# Patient Record
Sex: FEMALE | URBAN - METROPOLITAN AREA
[De-identification: names, ages, dates, MRNs, and addresses within clinical notes are randomized per-mention and may not be internally consistent; named-entity substitution may affect disease eponyms.]

---

## 2022-09-26 ENCOUNTER — HOME CARE VISIT (OUTPATIENT)
Dept: HOME HEALTH SERVICES | Facility: HOME HEALTH | Age: 87
End: 2022-09-26

## 2022-09-26 ENCOUNTER — HOME HEALTH ADMISSION (OUTPATIENT)
Dept: HOME HEALTH SERVICES | Facility: HOME HEALTH | Age: 87
End: 2022-09-26

## 2022-09-26 ENCOUNTER — HOME CARE VISIT (OUTPATIENT)
Dept: SCHEDULING | Facility: HOME HEALTH | Age: 87
End: 2022-09-26

## 2022-09-26 PROCEDURE — 0221000100 HH NO PAY CLAIM PROCEDURE

## 2022-09-26 PROCEDURE — G0299 HHS/HOSPICE OF RN EA 15 MIN: HCPCS

## 2022-09-27 ENCOUNTER — HOME CARE VISIT (OUTPATIENT)
Dept: HOME HEALTH SERVICES | Facility: HOME HEALTH | Age: 87
End: 2022-09-27

## 2022-09-28 VITALS
HEART RATE: 82 BPM | SYSTOLIC BLOOD PRESSURE: 136 MMHG | OXYGEN SATURATION: 93 % | DIASTOLIC BLOOD PRESSURE: 80 MMHG | RESPIRATION RATE: 18 BRPM | TEMPERATURE: 97.8 F

## 2022-09-28 ASSESSMENT — ENCOUNTER SYMPTOMS
PAIN LOCATION - PAIN QUALITY: ACHES
DYSPNEA ACTIVITY LEVEL: AFTER AMBULATING MORE THAN 20 FT

## 2022-09-28 NOTE — HOME HEALTH
Problem: RLE open wound to inner ankle, pink, moderate SS drainage 3x2x0.1. PMH: COPD, CHF, HTN, HLD, DM, Parkinsons. Intervention: [facility] SN SOC: Patient resides in [facility], alert and oriented x3, follows commands, mild forgetfulness, limited mobility uses walker for very short distance, wheelchair. Admitted to [facility] for SN wound care, as noted above, wound care per POC, tolerated well. CHF/COPD managed with diet and medications, VS/SATS WNL, lungs clear, edema absent, has Oxygen does not use regularly 3LNC PRN, SOB with exertion, patient at baseline. DM managed with diet/medications, -197 checking twice weekly by [facility]. Medications reconciled/managed by [facility]. Instructed on [facility] SOC when to call/911, High risk medications Eliquis/risk for bleeding/Insulin/hypoglycemia, DM/COPD/CHF, wound care/diet to promote healing, safety/fall/pressure injury/bleeding precautions, medications/disease management, and plan of care, taught back understanding, needs reinforcement. Goal: Patient will be safe at home free from falls/injury/infectio, wound healed, free from complications of CHF/COPD/DM/bleeding.

## 2022-09-30 ENCOUNTER — HOME CARE VISIT (OUTPATIENT)
Dept: HOME HEALTH SERVICES | Facility: HOME HEALTH | Age: 87
End: 2022-09-30
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
## 2022-10-03 ENCOUNTER — HOME CARE VISIT (OUTPATIENT)
Dept: HOME HEALTH SERVICES | Facility: HOME HEALTH | Age: 87
End: 2022-10-03

## 2022-11-14 ENCOUNTER — HOME HEALTH ADMISSION (OUTPATIENT)
Dept: HOME HEALTH SERVICES | Facility: HOME HEALTH | Age: 87
End: 2022-11-14
Payer: MEDICARE

## 2022-11-16 ENCOUNTER — HOME CARE VISIT (OUTPATIENT)
Dept: HOME HEALTH SERVICES | Facility: HOME HEALTH | Age: 87
End: 2022-11-16
Payer: MEDICARE

## 2022-11-16 ENCOUNTER — HOME CARE VISIT (OUTPATIENT)
Dept: SCHEDULING | Facility: HOME HEALTH | Age: 87
End: 2022-11-16
Payer: MEDICARE

## 2022-11-16 VITALS
SYSTOLIC BLOOD PRESSURE: 112 MMHG | RESPIRATION RATE: 18 BRPM | HEART RATE: 97 BPM | OXYGEN SATURATION: 96 % | TEMPERATURE: 98.2 F | DIASTOLIC BLOOD PRESSURE: 60 MMHG

## 2022-11-16 PROCEDURE — 1090000001 HH PPS REVENUE CREDIT

## 2022-11-16 PROCEDURE — 1090000002 HH PPS REVENUE DEBIT

## 2022-11-16 PROCEDURE — G0299 HHS/HOSPICE OF RN EA 15 MIN: HCPCS

## 2022-11-16 PROCEDURE — 0221000100 HH NO PAY CLAIM PROCEDURE

## 2022-11-16 PROCEDURE — G0152 HHCP-SERV OF OT,EA 15 MIN: HCPCS

## 2022-11-16 PROCEDURE — 400013 HH SOC

## 2022-11-16 PROCEDURE — G0151 HHCP-SERV OF PT,EA 15 MIN: HCPCS

## 2022-11-16 ASSESSMENT — ENCOUNTER SYMPTOMS: DYSPNEA ACTIVITY LEVEL: AFTER AMBULATING 10 - 20 FT

## 2022-11-16 NOTE — HOME HEALTH
Problem: GLF/syncope with LLE femur FX with routine healing. Left knee skin trauma 3x3x0.1, pink, moderate SS drainage, Left shin 0.5x0.5x0.1, pink, moderate SS drainage. PMH: COPD, Pulmonary Fibrosis, Dementia, DM, HTN, Parkinsons, Gerd, Neuropathy, OA, HLD, Hypothyroid, Afib. Intervention: [facility] SN SOC: Patient returned to [facility] yesterday from above, admitted to [place] [address] for SN/PT/OT. Alert and oriented x3, mildly forgetful, follows commands, requires moderate to max assistance with ADLS/transfers, ambulatory short distance with walker, uses wheelchair, weakness, unsteady gait, fall risk, deconditioning, will have therapy evaluation and treatment. Wounds as noted above, wound care per POC, instructed on wound care/diet/DM to promote healing, infection prevention, continue to reinforce. COPD/Pulmonary Fibrosis, lungs/chronic crackles to B/L bases at baseline, SOB with exertion, denies cough, VS/SATS WN on RA, no longer uses oxygen, instructed on cough/deep breathing/energy conservation, taught back understanding. DM managed by diet/medications, managed by [facility], checking BS 4x daily  today, feet clean dry and intact, instructed on DM/hypo/hyperglycemia, sick day, infection, foot care, needs reinforcement. Chronic pain R/T neuropathy/OA, aches/sharp with activity, treated with medications/rest 0-4/10. Medications reconciled, managed by [facility], reviewed with patient, instructed on high risk medications Insulin/hypoglycemia, Eliquis/risk for bleeding, continue to reinforce. Instructed on [place] [address] SOC when to call/911, safety/fall/infection/pressure injury/bleeding/COPD/DM precautions, medication/disease management, wound care, diet, DM, COPD, pain, and plan of care, taught back understanding, continue to reinforce. Goal: Patient will be safe at home free from falls/injury/infection, wounds healed, free from complications of DM/bleeding/COPD, rehabilitate to optimal level of function.

## 2022-11-17 PROCEDURE — 1090000002 HH PPS REVENUE DEBIT

## 2022-11-17 PROCEDURE — 1090000001 HH PPS REVENUE CREDIT

## 2022-11-18 PROCEDURE — 1090000001 HH PPS REVENUE CREDIT

## 2022-11-18 PROCEDURE — 1090000002 HH PPS REVENUE DEBIT

## 2022-11-19 ENCOUNTER — HOME CARE VISIT (OUTPATIENT)
Dept: HOME HEALTH SERVICES | Facility: HOME HEALTH | Age: 87
End: 2022-11-19
Payer: MEDICARE

## 2022-11-19 PROCEDURE — G0157 HHC PT ASSISTANT EA 15: HCPCS

## 2022-11-19 PROCEDURE — 1090000002 HH PPS REVENUE DEBIT

## 2022-11-19 PROCEDURE — 1090000001 HH PPS REVENUE CREDIT

## 2022-11-20 PROCEDURE — 1090000001 HH PPS REVENUE CREDIT

## 2022-11-20 PROCEDURE — 1090000002 HH PPS REVENUE DEBIT

## 2022-11-21 ENCOUNTER — HOME CARE VISIT (OUTPATIENT)
Dept: SCHEDULING | Facility: HOME HEALTH | Age: 87
End: 2022-11-21
Payer: MEDICARE

## 2022-11-21 ENCOUNTER — HOME CARE VISIT (OUTPATIENT)
Dept: HOME HEALTH SERVICES | Facility: HOME HEALTH | Age: 87
End: 2022-11-21
Payer: MEDICARE

## 2022-11-21 VITALS
SYSTOLIC BLOOD PRESSURE: 146 MMHG | TEMPERATURE: 97.8 F | HEART RATE: 80 BPM | RESPIRATION RATE: 18 BRPM | OXYGEN SATURATION: 93 % | DIASTOLIC BLOOD PRESSURE: 76 MMHG

## 2022-11-21 PROCEDURE — 1090000002 HH PPS REVENUE DEBIT

## 2022-11-21 PROCEDURE — G0152 HHCP-SERV OF OT,EA 15 MIN: HCPCS

## 2022-11-21 PROCEDURE — G0299 HHS/HOSPICE OF RN EA 15 MIN: HCPCS

## 2022-11-21 PROCEDURE — 1090000001 HH PPS REVENUE CREDIT

## 2022-11-21 ASSESSMENT — ENCOUNTER SYMPTOMS
BOWEL INCONTINENCE: 1
DYSPNEA ACTIVITY LEVEL: AFTER AMBULATING MORE THAN 20 FT

## 2022-11-21 NOTE — HOME HEALTH
Problem: LLE wounds proximal healing pink, small SS drainage, 2x1x0.1, distal 0.2x0.2x0.1, pink, scant SS drianage. Intervention: [facility] SN wound care per POC, tolerated well, instructd, on wound care/diet to promote healing, taught back understanding, DM managed with diet and medications . Instructed on wound care, DM, infection/safety/fall/bleeding/pressure injury precautions, medications/disease management, and plan of care, taught back understanding. Goal: Patient will be safe at home free from falls/injury/infection, wounds healed, free from complications of DM/bleeding.

## 2022-11-22 ENCOUNTER — HOME CARE VISIT (OUTPATIENT)
Dept: HOME HEALTH SERVICES | Facility: HOME HEALTH | Age: 87
End: 2022-11-22
Payer: MEDICARE

## 2022-11-22 PROCEDURE — 1090000002 HH PPS REVENUE DEBIT

## 2022-11-22 PROCEDURE — G0157 HHC PT ASSISTANT EA 15: HCPCS

## 2022-11-22 PROCEDURE — 1090000001 HH PPS REVENUE CREDIT

## 2022-11-23 ENCOUNTER — HOME CARE VISIT (OUTPATIENT)
Dept: SCHEDULING | Facility: HOME HEALTH | Age: 87
End: 2022-11-23
Payer: MEDICARE

## 2022-11-23 VITALS
HEART RATE: 90 BPM | TEMPERATURE: 97.8 F | RESPIRATION RATE: 18 BRPM | SYSTOLIC BLOOD PRESSURE: 128 MMHG | OXYGEN SATURATION: 98 % | DIASTOLIC BLOOD PRESSURE: 80 MMHG

## 2022-11-23 PROCEDURE — 1090000002 HH PPS REVENUE DEBIT

## 2022-11-23 PROCEDURE — 1090000001 HH PPS REVENUE CREDIT

## 2022-11-23 PROCEDURE — G0299 HHS/HOSPICE OF RN EA 15 MIN: HCPCS

## 2022-11-23 ASSESSMENT — ENCOUNTER SYMPTOMS: DYSPNEA ACTIVITY LEVEL: AFTER AMBULATING MORE THAN 20 FT

## 2022-11-24 PROCEDURE — 1090000001 HH PPS REVENUE CREDIT

## 2022-11-24 PROCEDURE — 1090000002 HH PPS REVENUE DEBIT

## 2022-11-25 PROCEDURE — 1090000001 HH PPS REVENUE CREDIT

## 2022-11-25 PROCEDURE — 1090000002 HH PPS REVENUE DEBIT

## 2022-11-26 ENCOUNTER — HOME CARE VISIT (OUTPATIENT)
Dept: HOME HEALTH SERVICES | Facility: HOME HEALTH | Age: 87
End: 2022-11-26
Payer: MEDICARE

## 2022-11-26 PROCEDURE — 1090000001 HH PPS REVENUE CREDIT

## 2022-11-26 PROCEDURE — 1090000002 HH PPS REVENUE DEBIT

## 2022-11-27 PROCEDURE — 1090000002 HH PPS REVENUE DEBIT

## 2022-11-27 PROCEDURE — 1090000001 HH PPS REVENUE CREDIT

## 2022-11-28 ENCOUNTER — HOME CARE VISIT (OUTPATIENT)
Dept: HOME HEALTH SERVICES | Facility: HOME HEALTH | Age: 87
End: 2022-11-28
Payer: MEDICARE

## 2022-11-28 ENCOUNTER — HOME CARE VISIT (OUTPATIENT)
Dept: SCHEDULING | Facility: HOME HEALTH | Age: 87
End: 2022-11-28
Payer: MEDICARE

## 2022-11-28 VITALS
OXYGEN SATURATION: 97 % | SYSTOLIC BLOOD PRESSURE: 128 MMHG | DIASTOLIC BLOOD PRESSURE: 62 MMHG | RESPIRATION RATE: 18 BRPM | HEART RATE: 97 BPM | TEMPERATURE: 98.1 F

## 2022-11-28 PROCEDURE — 1090000002 HH PPS REVENUE DEBIT

## 2022-11-28 PROCEDURE — 1090000001 HH PPS REVENUE CREDIT

## 2022-11-28 PROCEDURE — G0299 HHS/HOSPICE OF RN EA 15 MIN: HCPCS

## 2022-11-28 PROCEDURE — G0157 HHC PT ASSISTANT EA 15: HCPCS

## 2022-11-28 ASSESSMENT — ENCOUNTER SYMPTOMS: DYSPNEA ACTIVITY LEVEL: AFTER AMBULATING MORE THAN 20 FT

## 2022-11-28 NOTE — HOME HEALTH
Problem: LLE wounds dressing removed, CDI, pink,closed, may leave SHAE. Intervention: [facility] SN wound care per POC, tolerated well, instructed on wound care/diet to promote healing, infection/pressure injury prevention, needs reinforcement. DM managed with diet and medications,  today, with range 116-232, continue to monitor. Working with therapy to meet goals. Instructed on medications/disease management, DM, safety/fall/pressure injury/infection/bleeding precautions, needs reinforcement. Goal: Patient will be safe at home free from falls/injury/infection, free from complications of bleeding/DM, wound healed, rehabilitate to optimal level of function. yes

## 2022-11-29 ENCOUNTER — HOME CARE VISIT (OUTPATIENT)
Dept: HOME HEALTH SERVICES | Facility: HOME HEALTH | Age: 87
End: 2022-11-29
Payer: MEDICARE

## 2022-11-29 PROCEDURE — G0158 HHC OT ASSISTANT EA 15: HCPCS

## 2022-11-29 PROCEDURE — 1090000001 HH PPS REVENUE CREDIT

## 2022-11-29 PROCEDURE — 1090000002 HH PPS REVENUE DEBIT

## 2022-11-30 PROCEDURE — 1090000001 HH PPS REVENUE CREDIT

## 2022-11-30 PROCEDURE — 1090000002 HH PPS REVENUE DEBIT

## 2022-12-01 ENCOUNTER — HOME CARE VISIT (OUTPATIENT)
Dept: SCHEDULING | Facility: HOME HEALTH | Age: 87
End: 2022-12-01
Payer: MEDICARE

## 2022-12-01 VITALS
HEART RATE: 82 BPM | RESPIRATION RATE: 18 BRPM | SYSTOLIC BLOOD PRESSURE: 128 MMHG | OXYGEN SATURATION: 98 % | TEMPERATURE: 97.7 F | DIASTOLIC BLOOD PRESSURE: 70 MMHG

## 2022-12-01 PROCEDURE — 1090000001 HH PPS REVENUE CREDIT

## 2022-12-01 PROCEDURE — G0299 HHS/HOSPICE OF RN EA 15 MIN: HCPCS

## 2022-12-01 PROCEDURE — 1090000002 HH PPS REVENUE DEBIT

## 2022-12-01 ASSESSMENT — ENCOUNTER SYMPTOMS
DYSPNEA ACTIVITY LEVEL: AFTER AMBULATING MORE THAN 20 FT
BOWEL INCONTINENCE: 1

## 2022-12-01 NOTE — HOME HEALTH
Problem: LLE wounds dressing removed, CDI, pink, remains closed, may leave SHAE, monitor skin as she frequently opens up new wounds, slow healing R/T DM and circulation. Intervention: [facility] SN wound care per POC, tolerated well, instructed on wound care/diet to promote healing, infection/pressure injury prevention, needs reinforcement. DM managed with diet and medications,  today, with range 116-232, continue to monitor. Working with therapy to meet goals. Instructed on medications/disease management, DM, safety/fall/pressure injury/infection/bleeding precautions, needs reinforcement. Goal: Patient will be safe at home free from falls/injury/infection, free from complications of bleeding/DM, wound healed, rehabilitate to optimal level of function.

## 2022-12-02 ENCOUNTER — HOME CARE VISIT (OUTPATIENT)
Dept: HOME HEALTH SERVICES | Facility: HOME HEALTH | Age: 87
End: 2022-12-02
Payer: MEDICARE

## 2022-12-02 ENCOUNTER — HOME CARE VISIT (OUTPATIENT)
Dept: SCHEDULING | Facility: HOME HEALTH | Age: 87
End: 2022-12-02
Payer: MEDICARE

## 2022-12-02 VITALS
TEMPERATURE: 97.7 F | OXYGEN SATURATION: 96 % | SYSTOLIC BLOOD PRESSURE: 142 MMHG | DIASTOLIC BLOOD PRESSURE: 61 MMHG | HEART RATE: 92 BPM | RESPIRATION RATE: 18 BRPM

## 2022-12-02 PROCEDURE — G0299 HHS/HOSPICE OF RN EA 15 MIN: HCPCS

## 2022-12-02 PROCEDURE — G0157 HHC PT ASSISTANT EA 15: HCPCS

## 2022-12-02 ASSESSMENT — ENCOUNTER SYMPTOMS: DYSPNEA ACTIVITY LEVEL: AFTER AMBULATING MORE THAN 20 FT

## 2022-12-02 NOTE — HOME HEALTH
Problem: LLE wounds dressing removed, CDI, pink, remains closed, may leave SHAE, monitor skin as she frequently opens up new wounds, slow healing R/T DM and circulation. PRN visit for new Coccyx Stage II pressure injury 4x2x0.1, pink, scant SS drainage. Intervention: [facility] SN wound care per POC, tolerated well, instructed on wound care/diet to promote healing, infection/pressure injury prevention, needs reinforcement. DM managed with diet and medications,  today, with range 102-206, continue to monitor. Working with therapy to meet goals. Instructed on medications/disease management, DM, safety/fall/pressure injury/infection/bleeding precautions, needs reinforcement. Goal: Patient will be safe at home free from falls/injury/infection, free from complications of bleeding/DM, wound healed, rehabilitate to optimal level of function.

## 2022-12-06 ENCOUNTER — HOME CARE VISIT (OUTPATIENT)
Dept: HOME HEALTH SERVICES | Facility: HOME HEALTH | Age: 87
End: 2022-12-06
Payer: MEDICARE

## 2022-12-06 ENCOUNTER — HOME CARE VISIT (OUTPATIENT)
Dept: SCHEDULING | Facility: HOME HEALTH | Age: 87
End: 2022-12-06
Payer: MEDICARE

## 2022-12-06 VITALS
OXYGEN SATURATION: 98 % | TEMPERATURE: 97.8 F | HEART RATE: 96 BPM | SYSTOLIC BLOOD PRESSURE: 128 MMHG | RESPIRATION RATE: 18 BRPM | DIASTOLIC BLOOD PRESSURE: 60 MMHG

## 2022-12-06 PROCEDURE — G0299 HHS/HOSPICE OF RN EA 15 MIN: HCPCS

## 2022-12-06 PROCEDURE — G0158 HHC OT ASSISTANT EA 15: HCPCS

## 2022-12-06 ASSESSMENT — ENCOUNTER SYMPTOMS: DYSPNEA ACTIVITY LEVEL: AFTER AMBULATING MORE THAN 20 FT

## 2022-12-06 NOTE — HOME HEALTH
Problem: LLE wounds, CDI, pink, remains closed, may leave SHAE, monitor skin as she frequently opens up new wounds, slow healing R/T DM and circulation. Coccyx Stage II pressure injury 0.5x0.2x0.1, pink, scant SS drainage. Intervention: [facility] SN wound care per POC, tolerated well, instructed on wound care/diet to promote healing, infection/pressure injury prevention, needs reinforcement. DM managed with diet and medications,  today, with range 101-282continue to monitor. Working with therapy to meet goals. Instructed on medications/disease management, DM, safety/fall/pressure injury/infection/bleeding precautions, needs reinforcement. Goal: Patient will be safe at home free from falls/injury/infection, free from complications of bleeding/DM, wound healed, rehabilitate to optimal level of function. 22-Jun-2020 10:59

## 2022-12-07 ENCOUNTER — HOME CARE VISIT (OUTPATIENT)
Dept: HOME HEALTH SERVICES | Facility: HOME HEALTH | Age: 87
End: 2022-12-07
Payer: MEDICARE

## 2022-12-07 PROCEDURE — G0151 HHCP-SERV OF PT,EA 15 MIN: HCPCS

## 2022-12-08 ENCOUNTER — HOME CARE VISIT (OUTPATIENT)
Dept: SCHEDULING | Facility: HOME HEALTH | Age: 87
End: 2022-12-08
Payer: MEDICARE

## 2022-12-08 VITALS
OXYGEN SATURATION: 96 % | TEMPERATURE: 97.6 F | RESPIRATION RATE: 18 BRPM | HEART RATE: 100 BPM | DIASTOLIC BLOOD PRESSURE: 84 MMHG | SYSTOLIC BLOOD PRESSURE: 142 MMHG

## 2022-12-08 PROCEDURE — G0299 HHS/HOSPICE OF RN EA 15 MIN: HCPCS

## 2022-12-08 ASSESSMENT — ENCOUNTER SYMPTOMS: DYSPNEA ACTIVITY LEVEL: AFTER AMBULATING MORE THAN 20 FT

## 2022-12-08 NOTE — HOME HEALTH
Problem: LLE wounds, CDI, pink, remains closed, may leave SHAE, monitor skin as she frequently opens up new wounds, slow healing R/T DM and circulation. Coccyx Stage II pressure injury 0.2x0.2x0.1, pink, scant SS drainage. Intervention: [facility] SN wound care per POC, tolerated well, instructed on wound care/diet to promote healing, infection/pressure injury prevention, needs reinforcement. DM managed with diet and medications,  today, with range 101-282 continue to monitor. Working with therapy to meet goals. Instructed on medications/disease management, DM, safety/fall/pressure injury/infection/bleeding precautions, needs reinforcement. Goal: Patient will be safe at home free from falls/injury/infection, free from complications of bleeding/DM, wound healed, rehabilitate to optimal level of function.
13.5

## 2022-12-12 ENCOUNTER — HOME CARE VISIT (OUTPATIENT)
Dept: SCHEDULING | Facility: HOME HEALTH | Age: 87
End: 2022-12-12
Payer: MEDICARE

## 2022-12-12 VITALS
TEMPERATURE: 97.6 F | RESPIRATION RATE: 18 BRPM | SYSTOLIC BLOOD PRESSURE: 148 MMHG | DIASTOLIC BLOOD PRESSURE: 60 MMHG | OXYGEN SATURATION: 98 % | HEART RATE: 88 BPM

## 2022-12-12 PROCEDURE — G0299 HHS/HOSPICE OF RN EA 15 MIN: HCPCS

## 2022-12-12 ASSESSMENT — ENCOUNTER SYMPTOMS: DYSPNEA ACTIVITY LEVEL: AFTER AMBULATING MORE THAN 20 FT

## 2022-12-12 NOTE — HOME HEALTH
Problem: Coccyx Stage II pressure injury,, with routine healing, red/pink, Excoriated/Fragile, wound closed, high risk for reopening R/T moisture/immobility/pressure/friction, continue wound care/pressure injury prevention. Intervention: [facility] SN wound care per POC, tolerated well, instructed on wound care/diet to promote healing, infection/pressure injury prevention, needs reinforcement. DM managed with diet and medications,  today, with range 101-282 continue to monitor. Has completed therapy. Instructed on medications/disease management, DM, safety/fall/pressure injury/infection/bleeding precautions, needs reinforcement. Goal: Patient will be safe at home free from falls/injury/infection, free from complications of bleeding/DM, wound healed, rehabilitate to optimal level of function.

## 2022-12-15 ENCOUNTER — HOME CARE VISIT (OUTPATIENT)
Dept: SCHEDULING | Facility: HOME HEALTH | Age: 87
End: 2022-12-15
Payer: MEDICARE

## 2022-12-15 VITALS
OXYGEN SATURATION: 99 % | RESPIRATION RATE: 18 BRPM | HEART RATE: 88 BPM | TEMPERATURE: 97.4 F | DIASTOLIC BLOOD PRESSURE: 60 MMHG | SYSTOLIC BLOOD PRESSURE: 112 MMHG

## 2022-12-15 PROCEDURE — G0299 HHS/HOSPICE OF RN EA 15 MIN: HCPCS

## 2022-12-15 ASSESSMENT — ENCOUNTER SYMPTOMS: DYSPNEA ACTIVITY LEVEL: AFTER AMBULATING MORE THAN 20 FT

## 2022-12-15 NOTE — HOME HEALTH
Problem: Coccyx Stage II pressure injury,, with routine healing, red/pink, Excoriated/Fragile, wound closed, high risk for reopening R/T moisture/immobility/pressure/friction, a few dry scratches, moisture barrier leave SHAE, continue wound care/pressure injury prevention. Intervention: [facility] SN wound care per POC, tolerated well, instructed on wound care/diet to promote healing, infection/pressure injury prevention, needs reinforcement. DM managed with diet and medications,  today, with range 101-282 continue to monitor. Has completed therapy, plan DC next week if no further needs/wound remains closed. Instructed on medications/disease management, DM, safety/fall/pressure injury/infection/bleeding precautions, needs reinforcement. Goal: Patient will be safe at home free from falls/injury/infection, free from complications of bleeding/DM, wound healed, rehabilitate to optimal level of function.

## 2022-12-19 ENCOUNTER — HOME CARE VISIT (OUTPATIENT)
Dept: SCHEDULING | Facility: HOME HEALTH | Age: 87
End: 2022-12-19
Payer: MEDICARE

## 2022-12-19 VITALS
SYSTOLIC BLOOD PRESSURE: 148 MMHG | HEART RATE: 81 BPM | OXYGEN SATURATION: 96 % | TEMPERATURE: 97.8 F | RESPIRATION RATE: 16 BRPM | DIASTOLIC BLOOD PRESSURE: 82 MMHG

## 2022-12-19 PROCEDURE — G0299 HHS/HOSPICE OF RN EA 15 MIN: HCPCS

## 2022-12-19 ASSESSMENT — ENCOUNTER SYMPTOMS: DYSPNEA ACTIVITY LEVEL: AFTER AMBULATING MORE THAN 20 FT

## 2022-12-19 NOTE — HOME HEALTH
Patient seen for LE wound, GLF with femur FX,, pressure injury to Coccyx. Wounds all resolved, CDI/SHAE, reinforced pressure injury prevention, wound care/infection/bleeding/diet to promote healing, DM/COPD/CHF/fall/safety precautions, medications/disease management, taught back understanding. VS/SATS WNL, edema absent, lungs clear, has met goals of [facility] SN/therapy at maximum potential.    Continue routine care as directed by physician in [place] setting, safety/fall/pressure injury/infection/bleeding/DM/COPD/CHF precuations, when to call MD/911 weight gain 2-3# in overnight, 4-5 in a week, BS <70 or >400, S/S of bleeding, increased edema/SOB. Take medications as ordered, follow up with MD as directed. Goal: Patient will be safe at home free from falls/injury/infection, wounds healed, free from complications of CHF/COPD/DM/bleeding, rehabilitate to optimal level of function. Patient discharged from [facility] to [facility] with assistance.

## 2023-05-17 ENCOUNTER — HOME HEALTH ADMISSION (OUTPATIENT)
Dept: HOME HEALTH SERVICES | Facility: HOME HEALTH | Age: 88
End: 2023-05-17

## 2023-05-18 ENCOUNTER — HOME CARE VISIT (OUTPATIENT)
Dept: HOME HEALTH SERVICES | Facility: HOME HEALTH | Age: 88
End: 2023-05-18

## 2023-05-18 ENCOUNTER — HOME CARE VISIT (OUTPATIENT)
Dept: SCHEDULING | Facility: HOME HEALTH | Age: 88
End: 2023-05-18

## 2023-05-18 VITALS
DIASTOLIC BLOOD PRESSURE: 78 MMHG | HEART RATE: 85 BPM | TEMPERATURE: 97.6 F | OXYGEN SATURATION: 96 % | SYSTOLIC BLOOD PRESSURE: 148 MMHG | RESPIRATION RATE: 18 BRPM

## 2023-05-18 PROCEDURE — 0221000100 HH NO PAY CLAIM PROCEDURE

## 2023-05-18 PROCEDURE — G0299 HHS/HOSPICE OF RN EA 15 MIN: HCPCS

## 2023-05-18 ASSESSMENT — ENCOUNTER SYMPTOMS
DYSPNEA ACTIVITY LEVEL: AFTER AMBULATING 10 - 20 FT
PAIN LOCATION - PAIN QUALITY: ACHES

## 2023-05-18 NOTE — HOME HEALTH
Problem: Patien admitted to [facility] SN/PT/OT for functional decline R/T Parkinsons, weakness, unsteady gait, deconditioning, fall risk, will have therapy evaluation and treatment. +1 BLLE pedal/pitting edema, open wound to RLE inner ankle 3x3x0.1, pink/white base, small SS drianage. PMH: COPD, Parkinsons, Dementia, CHF, HTN, DM. Intervention: [facility] SN SOC: Patient resides in [place], mild Dementia, Alert and oriented x3 with verbal prompts, some MS changes/cognitive decline, will have U/A C+S to R/O UTI. Instructed on UTI/infection prevention, proper hygiene, adequate hydration, continue to reinforce, denies any difficulty or discomfort with voiding. Wound care per POC, tolerated well, instructed on wound care, infection prevention, pressure injury prevention, DM skin care, diet to promote healing, needs reinforcement. COPD/CHF managed with diet/medications, lungs clear, VS/SATS WNL, +1 pedal/pitting edema, instructed on CHF/COPD, energy conservation/CDB, medications/disease management, needs reinforcement. DM managed with diet/medications, instructed on hypo-hyperglycemia, sick day, sharps, evening snack, DM foot care, medications/disease management, needs reinforcement. Medications reconciled, reviewed with patient, high risk medications/risk for bleeding, Eliquis/Insulin, needs reinforcement. Instructed on [facility] SOC when to call/911, safety/fall/pressure injury/COPD/CHF/DM, bleeding/infection precautions, DM, COPD, CHF, DM, wound care, and plan of care, needs reinforcement. Goal: Patient will be safe at home free from falls/injury/infection, wound healed, free from complications of DM/COPD/CHF/bleeding, rehabilitate to optimal level of function.

## 2023-05-19 ENCOUNTER — HOME CARE VISIT (OUTPATIENT)
Dept: SCHEDULING | Facility: HOME HEALTH | Age: 88
End: 2023-05-19

## 2023-05-19 ENCOUNTER — HOME CARE VISIT (OUTPATIENT)
Dept: HOME HEALTH SERVICES | Facility: HOME HEALTH | Age: 88
End: 2023-05-19

## 2023-05-23 ENCOUNTER — HOME CARE VISIT (OUTPATIENT)
Dept: HOME HEALTH SERVICES | Facility: HOME HEALTH | Age: 88
End: 2023-05-23

## 2023-09-01 NOTE — HOME HEALTH
Problem: Open wounds to LE with routine healing, pink, scant SS drainage, upper 2x1x0.1, lower 0.2x0.2x0.1. Intervention: [facility] SN wound care per POC, tolerated well, instructed on wound care/diet to promote healing, taught back understanding. DM  today , managed with diet and medications by long-term. COPD at baseline, lungs clear, VS/SATS WNL. Instructed on wound care, DM/COPD, medications/disease management, safety/fall/bleeding/pressure injury/infection precautions, and plan of care, taught back understanding. Goal: Patient will be safe at home free from falls/injury/infection, wounds healed, free from complications of bleeding/COPD/DM.
PAST SURGICAL HISTORY:  H/O prior ablation treatment